# Patient Record
(demographics unavailable — no encounter records)

---

## 2025-04-02 NOTE — PROCEDURE
[FreeTextEntry1] : Using sterile technique, the ganglion cyst was aspirated. She was instructed on activity modification. She understands the relatively high risk of recurrence. If it recurs and causes her symptoms, then she will return to the office to discuss further treatment options.

## 2025-04-02 NOTE — ADDENDUM
[FreeTextEntry1] :  I, Jacoby Carrion, acted solely as a scribe for Dr. Sigala on this date on 04/02/2025.

## 2025-04-02 NOTE — CONSULT LETTER
[Dear  ___] : Dear  [unfilled], [Consult Letter:] : I had the pleasure of evaluating your patient, [unfilled]. [Please see my note below.] : Please see my note below. [Consult Closing:] : Thank you very much for allowing me to participate in the care of this patient.  If you have any questions, please do not hesitate to contact me. [Sincerely,] : Sincerely, [FreeTextEntry3] : Arturo Sigala M.D. Surgery of the Hand & Upper Extremity Orthopaedic Surgery Chief, Hand Service, [facility] Director, Hand  of Orthopedic Surgery, [facility] [employer] at \[facility] [place] [facility]\""/[facility] HospitalEmail: [email or web address] Office Phone: [phone]

## 2025-04-02 NOTE — END OF VISIT
[FreeTextEntry3] : This note was written by Jacoby Carrion on 04/02/2025 acting solely as a scribe for Dr. Arturo Sigala.   All medical record entries made by the Scribe were at my, Dr. Arturo Sigala, direction and personally dictated by me on 04/02/2025. I have personally reviewed the chart and agree that the record accurately reflects my personal performance of the history, physical exam, assessment and plan.

## 2025-04-02 NOTE — HISTORY OF PRESENT ILLNESS
[Right] : right hand dominant [FreeTextEntry1] : She comes in today for evaluation of left wrist cyst which she noticed 405 months ago. She has numbness and pain at times with certain movements.  She was referred by Dr. Thompson.   She is accompanied by her daughter.

## 2025-04-02 NOTE — PHYSICAL EXAM
[de-identified] : - Constitutional: This is a female in no obvious distress. She is accompanied by her daughter.  - Psych: Patient is alert and oriented to person, place and time.  Patient has a normal mood and affect. - Cardiovascular: Normal pulses throughout the upper extremities.  No significant varicosities are noted in the upper extremities.  ---  Examination of her left wrist demonstrate a volar ganglion cyst.  There is some tenderness.  Is nonpulsatile.  She has full motion.  She is neurovascularly intact distally.

## 2025-04-02 NOTE — DISCUSSION/SUMMARY
[FreeTextEntry1] : She has findings consistent with a left wrist volar ganglion cyst.   I had a discussion with the patient regarding today's visit, the prognosis of this diagnosis, and treatment recommendations and options. At this time, I discussed treatment options of observation, a cyst aspiration or surgical management. She opted and agreed to proceed with a cyst aspiration.    The patient has agreed to the above plan of management and has expressed full understanding. All questions were fully answered to the patient's satisfaction.   My cumulative time spent on this visit included: Preparation for the visit, review of the medical records, review of pertinent diagnostic studies, examination and counseling of the patient on the above diagnosis, treatment plan and prognosis, orders of diagnostic tests, medication and/or appropriate procedures and documentation in the medical records of today's visit.